# Patient Record
Sex: FEMALE | Race: BLACK OR AFRICAN AMERICAN | NOT HISPANIC OR LATINO | Employment: FULL TIME | URBAN - METROPOLITAN AREA
[De-identification: names, ages, dates, MRNs, and addresses within clinical notes are randomized per-mention and may not be internally consistent; named-entity substitution may affect disease eponyms.]

---

## 2019-04-23 ENCOUNTER — APPOINTMENT (OUTPATIENT)
Dept: GENERAL RADIOLOGY | Facility: HOSPITAL | Age: 49
End: 2019-04-23

## 2019-04-23 ENCOUNTER — HOSPITAL ENCOUNTER (EMERGENCY)
Facility: HOSPITAL | Age: 49
Discharge: HOME | End: 2019-04-23
Attending: EMERGENCY MEDICINE | Admitting: EMERGENCY MEDICINE

## 2019-04-23 VITALS
OXYGEN SATURATION: 97 % | HEART RATE: 83 BPM | RESPIRATION RATE: 16 BRPM | TEMPERATURE: 98.9 F | DIASTOLIC BLOOD PRESSURE: 90 MMHG | BODY MASS INDEX: 32.25 KG/M2 | HEIGHT: 63 IN | SYSTOLIC BLOOD PRESSURE: 140 MMHG | WEIGHT: 182 LBS

## 2019-04-23 DIAGNOSIS — M79.672 LEFT FOOT PAIN: ICD-10-CM

## 2019-04-23 DIAGNOSIS — S92.145A CLOSED NONDISPLACED FRACTURE OF DOME OF LEFT TALUS, INITIAL ENCOUNTER: Primary | ICD-10-CM

## 2019-04-23 PROCEDURE — 73610 X-RAY EXAM OF ANKLE: CPT

## 2019-04-23 PROCEDURE — 73630 X-RAY EXAM OF FOOT: CPT

## 2019-04-23 PROCEDURE — 99284 EMERGENCY DEPT VISIT MOD MDM: CPT

## 2019-04-23 RX ORDER — HYDROCODONE BITARTRATE AND ACETAMINOPHEN 5; 325 MG/1; MG/1
1 TABLET ORAL ONCE
Status: COMPLETED | OUTPATIENT
Start: 2019-04-23 | End: 2019-04-23

## 2019-04-23 RX ORDER — HYDROCODONE BITARTRATE AND ACETAMINOPHEN 5; 325 MG/1; MG/1
1 TABLET ORAL EVERY 8 HOURS PRN
Qty: 6 TABLET | Refills: 0 | Status: SHIPPED | OUTPATIENT
Start: 2019-04-23

## 2019-04-23 RX ADMIN — HYDROCODONE BITARTRATE AND ACETAMINOPHEN 1 TABLET: 5; 325 TABLET ORAL at 15:41

## 2019-04-24 ENCOUNTER — TELEPHONE (OUTPATIENT)
Dept: ORTHOPEDIC SURGERY | Facility: CLINIC | Age: 49
End: 2019-04-24

## 2019-04-24 ENCOUNTER — OFFICE VISIT (OUTPATIENT)
Dept: ORTHOPEDIC SURGERY | Facility: CLINIC | Age: 49
End: 2019-04-24

## 2019-04-24 VITALS — HEIGHT: 63 IN | BODY MASS INDEX: 32.25 KG/M2 | WEIGHT: 182 LBS | TEMPERATURE: 98.4 F

## 2019-04-24 DIAGNOSIS — S93.402A SPRAIN OF LEFT ANKLE, UNSPECIFIED LIGAMENT, INITIAL ENCOUNTER: ICD-10-CM

## 2019-04-24 DIAGNOSIS — M95.8 OSTEOCHONDRAL DEFECT OF TALUS: ICD-10-CM

## 2019-04-24 DIAGNOSIS — M19.079 ARTHRITIS OF FOOT: ICD-10-CM

## 2019-04-24 DIAGNOSIS — M19.079 ARTHRITIS OF ANKLE: ICD-10-CM

## 2019-04-24 DIAGNOSIS — S86.302A INJURY OF PERONEAL TENDON OF LEFT FOOT, INITIAL ENCOUNTER: Primary | ICD-10-CM

## 2019-04-24 DIAGNOSIS — S90.32XA CONTUSION OF LEFT FOOT, INITIAL ENCOUNTER: ICD-10-CM

## 2019-04-24 PROCEDURE — 99204 OFFICE O/P NEW MOD 45 MIN: CPT | Performed by: ORTHOPAEDIC SURGERY

## 2019-04-24 RX ORDER — MELOXICAM 15 MG/1
TABLET ORAL
Qty: 30 TABLET | Refills: 1 | Status: SHIPPED | OUTPATIENT
Start: 2019-04-24

## 2019-04-24 NOTE — TELEPHONE ENCOUNTER
Spoke with patient informing her that MWM would see her this afternoon @ 2:30, patient agreed appt was made.

## 2019-04-24 NOTE — TELEPHONE ENCOUNTER
Patient has a nondisplaced fracture of the left talus, seen at [facility] ED on 4/23. Xray in epic. Patient says her instructions are to be seen in 3days. Please Advise

## 2019-04-24 NOTE — PROGRESS NOTES
New Patient Complaint      Patient: Nano Craig  YOB: 1970 49 y.o. female  Medical Record Number: [id number]    Chief Complaints: Hurt my foot and ankle    History of Present Illness:   Patient injured her left foot and ankle yesterday when she was hiking in [facility] [employer] and slipped on some wet grass.  She struck the dorsum of her foot on a rock and twisted her ankle.  She was seen in the emergency room and placed into a boot today.  She is working here today with persistent moderate to severe constant stabbing aching pain in the left ankle and midfoot feelings of popping and swelling worse with standing and walking improved with anti-inflammatories and ice.    Prior to this she had no complaints of the foot or ankle       HPI    Allergies:   Allergies   Allergen Reactions   • Penicillins Hives       Medications:   Current Outpatient Medications on File Prior to Visit   Medication Sig   • HYDROcodone-acetaminophen (NORCO) 5-325 MG per tablet Take 1 tablet by mouth Every 8 (Eight) Hours As Needed for Severe Pain .   • PENNSAID 2 % solution Apply 2 Pump topically to the appropriate area as directed 2 (Two) Times a Day.     No current facility-administered medications on file prior to visit.        Past Medical History:   Diagnosis Date   • Rheumatoid arthritis (CMS/HCC)      Past Surgical History:   Procedure Laterality Date   •  SECTION      x2   • EAR TUBES     • HYSTERECTOMY       Social History     Occupational History   • Not on file   Tobacco Use   • Smoking status: Former Smoker     Last attempt to quit: 2004     Years since quitting: 15.0   • Smokeless tobacco: Never Used   Substance and Sexual Activity   • Alcohol use: Yes     Frequency: Monthly or less     Comment: OCC   • Drug use: No   • Sexual activity: Defer      Social History     Social History Narrative   • Not on file     Family History   Problem Relation Age of Onset   • No Known Problems Mother    • No Known  "Problems Father    • No Known Problems Sister    • No Known Problems Brother    • No Known Problems Maternal Aunt    • No Known Problems Maternal Uncle    • No Known Problems Paternal Aunt    • No Known Problems Paternal Uncle    • No Known Problems Maternal Grandmother    • No Known Problems Maternal Grandfather    • No Known Problems Paternal Grandmother    • No Known Problems Paternal Grandfather    • Anesthesia problems Neg Hx    • Broken bones Neg Hx    • Cancer Neg Hx    • Clotting disorder Neg Hx    • Collagen disease Neg Hx    • Diabetes Neg Hx    • Dislocations Neg Hx    • Osteoporosis Neg Hx    • Rheumatologic disease Neg Hx    • Scoliosis Neg Hx    • Severe sprains Neg Hx        Review of Systems: 14 point review of systems performed, positive pertinent findings identified in HPI. All remaining systems negative     Review of Systems      Physical Exam:   Vitals:    04/24/19 1449   Temp: 98.4 °F (36.9 °C)   TempSrc: Temporal   Weight: 82.6 kg (182 lb)   Height: 160 cm (63\")     Physical Exam   Constitutional: pleasant, well developed   Eyes: sclera non icteric  Hearing : adequate for exam  Cardiovascular: palpable pulses in left foot, left calf/ thigh NT without sign of DVT  Respiratoy: breathing unlabored   Neurological: grossly sensate to LT throughout left LE  Psychiatric: oriented with normal mood and affect.   Lymphatic: No palpable popliteal lymphadenopathy left LE  Skin: intact throughout left leg/foot  Musculoskeletal: Mild swelling of the dorsum of the left midfoot with minimal discomfort to palpation no exacerbation with tarsometatarsal manipulation.  Moderate discomfort along the lateral aspect of the ankle but really no focal discomfort medially.  Mild discomfort over the anterolateral aspect of the ankle and some to the peroneal tendons as well as some posteriorly at the Achilles but no palpable defects.  Physical Exam  Ortho Exam    Radiology: 3 views of the left foot and ankle reviewed on " the [facility] system from 4/23/2019.  There appears to be arthritic change at the tibiotalar joint with some spurring anteriorly and a small well-rounded ossification posterior to the subtalar joint.  No obvious fracture at the calcaneus but there is some prominence to the superior calcaneal tuberosity.  There appears to be relative elongation of the medial aspect of the navicular with some questionable sclerotic change or fracture over the midportion of the navicular.  Also appears to be chronic arthritic changes at the midfoot at the second more so than third TMT joint and some to the naviculocuneiform articulations there does appear to be some chronic sclerotic change over the lateral aspect of the talar dome and some very tiny calcifications in the medial joint space and inferomedial to the distal tibia medially    Assessment/Plan: 1.  1.  Left ankle anterolateral ligamentous sprain with possible peroneal tendon injury and possible occult Achilles injury  2.  Probable left midfoot contusion with exacerbation of pre-existing arthritis    Reviewed with her that there could be some occult fractures in the midfoot and that she appears to have significant arthritic change of the ankle with some chronic changes over the talar aspect.    We will have her do partial weightbearing with crutches and a boot and start her on meloxicam 15 mg 1 p.o. daily with GI precautions.    We need to get an MRI of her left hindfoot to evaluate for occult fracture or ligamentous injury as this will change our treatment protocol and she does a job where she has to stand.  We will keep her off work for now and she understands to call to schedule follow-up appointment after MRIs been scheduled in order to review results in the office

## 2019-05-02 ENCOUNTER — HOSPITAL ENCOUNTER (OUTPATIENT)
Dept: MRI IMAGING | Facility: HOSPITAL | Age: 49
Discharge: HOME | End: 2019-05-02
Admitting: ORTHOPAEDIC SURGERY

## 2019-05-02 DIAGNOSIS — S86.302A INJURY OF PERONEAL TENDON OF LEFT FOOT, INITIAL ENCOUNTER: ICD-10-CM

## 2019-05-02 DIAGNOSIS — S90.32XA CONTUSION OF LEFT FOOT, INITIAL ENCOUNTER: ICD-10-CM

## 2019-05-02 DIAGNOSIS — S93.402A SPRAIN OF LEFT ANKLE, UNSPECIFIED LIGAMENT, INITIAL ENCOUNTER: ICD-10-CM

## 2019-05-02 DIAGNOSIS — M95.8 OSTEOCHONDRAL DEFECT OF TALUS: ICD-10-CM

## 2019-05-02 PROCEDURE — 73721 MRI JNT OF LWR EXTRE W/O DYE: CPT

## 2019-05-06 ENCOUNTER — OFFICE VISIT (OUTPATIENT)
Dept: ORTHOPEDIC SURGERY | Facility: CLINIC | Age: 49
End: 2019-05-06

## 2019-05-06 VITALS — HEIGHT: 63 IN | TEMPERATURE: 98.4 F | WEIGHT: 182 LBS | BODY MASS INDEX: 32.25 KG/M2

## 2019-05-06 DIAGNOSIS — M19.079 ARTHRITIS OF FOOT: Primary | ICD-10-CM

## 2019-05-06 DIAGNOSIS — M94.272 CHONDROMALACIA, LEFT ANKLE AND JOINTS OF LEFT FOOT: ICD-10-CM

## 2019-05-06 PROCEDURE — 99213 OFFICE O/P EST LOW 20 MIN: CPT | Performed by: ORTHOPAEDIC SURGERY

## 2019-05-06 NOTE — PROGRESS NOTES
"Ankle Follow Up      Patient: Nano Craig    YOB: 1970 49 y.o. female    Chief Complaints: Ankle and foot are feeling better    History of Present Illness: Patient was seen on 4/24/2019 1 day after injuring her left foot and ankle when she was hiking and slipped on some wet grass striking the dorsum of her foot on a rock and twisting her ankle.    She was placed into a boot and sent for MRI.  She has since weaned out of the boot and states that her ankle is actually feeling quite a bit better still gets some mild achiness over the anterolateral aspect of the ankle worse with weather activities.  On further questioning today she says she actually did have some discomfort similar to this prior to her injury but was not quite as bad as it is been after her injury but is now back to about status quo.  She gets some occasional minimal discomfort over the dorsum of the midfoot as well and she has not had any feelings of instability  HPI    ROS: ankle pain  Past Medical History:   Diagnosis Date   • Rheumatoid arthritis (CMS/[facility])    • Sprain of left ankle 4/24/2019       Physical Exam:   Vitals:    05/06/19 1111   Temp: 98.4 °F (36.9 °C)   Weight: 82.6 kg (182 lb)   Height: 160 cm (63\")     Well developed with normal mood.  On exam she has mild discomfort of the anterolateral ankle but stable ligamentous exam and no pain with external rotation testing or proximal fibular compression testing.  Minimal discomfort over the dorsum of the midfoot in neutral dorsiflexion of the heel cord      Radiology: MRI films and report of the left ankle dated 5/2/2019 reviewed which showed old appearing cortical irregularity along the articular surface of the lateral talar dome measuring 9 mm x 15 mm with subcortical sclerosis.  Marginal osteophytes from the tibiotalar joint but no subcortical marrow edema or joint effusion.    Mild degenerative changes between the navicular and cuneiforms and more advanced arthritic change " at the second TMT joint with full-thickness chondral loss.    Peroneal tendons as well as flexor and extensor tendons and the anterolateral ligamentous structures as well as deltoid and syndesmosis are all intact.      Assessment/Plan: 1.  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    We demonstrated heel cord stretching exercises for her to do at least 4 times a day instruction she was provided and demonstrated for discussed etiology of heel cord tightness to midfoot overload.    Also have her use accommodative arch supports and if any symptoms persist or worsen she will let me know we may consider injection at her ankle possibly some therapy    We had a pleasant visit and she told me how much she appreciated my care and I will see her back as needed

## 2021-05-10 ENCOUNTER — TRANSCRIBE ORDERS (OUTPATIENT)
Dept: ADMINISTRATIVE | Facility: HOSPITAL | Age: 51
End: 2021-05-10

## 2021-05-10 ENCOUNTER — HOSPITAL ENCOUNTER (OUTPATIENT)
Dept: MRI IMAGING | Facility: HOSPITAL | Age: 51
Discharge: HOME | End: 2021-05-10
Admitting: ORTHOPAEDIC SURGERY

## 2021-05-10 DIAGNOSIS — M25.571 ACUTE RIGHT ANKLE PAIN: ICD-10-CM

## 2021-05-10 DIAGNOSIS — M25.571 ACUTE RIGHT ANKLE PAIN: Primary | ICD-10-CM

## 2021-05-10 PROCEDURE — 73721 MRI JNT OF LWR EXTRE W/O DYE: CPT
